# Patient Record
Sex: FEMALE | Race: BLACK OR AFRICAN AMERICAN | NOT HISPANIC OR LATINO
[De-identification: names, ages, dates, MRNs, and addresses within clinical notes are randomized per-mention and may not be internally consistent; named-entity substitution may affect disease eponyms.]

---

## 2022-10-13 ENCOUNTER — NON-APPOINTMENT (OUTPATIENT)
Age: 27
End: 2022-10-13

## 2022-10-13 ENCOUNTER — APPOINTMENT (OUTPATIENT)
Dept: ORTHOPEDIC SURGERY | Facility: CLINIC | Age: 27
End: 2022-10-13

## 2022-10-13 VITALS — BODY MASS INDEX: 28.25 KG/M2 | HEIGHT: 67 IN | WEIGHT: 180 LBS

## 2022-10-13 PROBLEM — Z00.00 ENCOUNTER FOR PREVENTIVE HEALTH EXAMINATION: Status: ACTIVE | Noted: 2022-10-13

## 2022-10-13 PROCEDURE — 72110 X-RAY EXAM L-2 SPINE 4/>VWS: CPT

## 2022-10-13 PROCEDURE — 72070 X-RAY EXAM THORAC SPINE 2VWS: CPT

## 2022-10-13 PROCEDURE — 99072 ADDL SUPL MATRL&STAF TM PHE: CPT

## 2022-10-13 PROCEDURE — 99204 OFFICE O/P NEW MOD 45 MIN: CPT

## 2022-10-13 NOTE — REASON FOR VISIT
[Initial Visit] : an initial visit for [Workers' Comp: Date of Injury: _______] : This visit is related to worker's compensation. Date of Injury: [unfilled]

## 2022-10-27 ENCOUNTER — APPOINTMENT (OUTPATIENT)
Dept: ORTHOPEDIC SURGERY | Facility: CLINIC | Age: 27
End: 2022-10-27

## 2022-10-27 PROCEDURE — 99214 OFFICE O/P EST MOD 30 MIN: CPT

## 2022-10-27 PROCEDURE — 99072 ADDL SUPL MATRL&STAF TM PHE: CPT

## 2022-10-27 RX ORDER — DOXYCYCLINE HYCLATE 100 MG/1
100 TABLET ORAL
Qty: 60 | Refills: 0 | Status: ACTIVE | COMMUNITY
Start: 2022-10-18

## 2022-10-27 RX ORDER — DOXYCYCLINE HYCLATE 100 MG/1
100 CAPSULE ORAL
Qty: 14 | Refills: 0 | Status: ACTIVE | COMMUNITY
Start: 2022-09-24

## 2022-10-27 RX ORDER — NAPROXEN 500 MG/1
500 TABLET ORAL
Qty: 60 | Refills: 1 | Status: ACTIVE | COMMUNITY
Start: 2022-10-27 | End: 1900-01-01

## 2022-10-27 RX ORDER — CYCLOBENZAPRINE HYDROCHLORIDE 5 MG/1
5 TABLET, FILM COATED ORAL 3 TIMES DAILY
Qty: 21 | Refills: 1 | Status: ACTIVE | COMMUNITY
Start: 2022-10-27 | End: 1900-01-01

## 2022-10-27 RX ORDER — NAPROXEN 250 MG/1
250 TABLET ORAL
Qty: 30 | Refills: 0 | Status: ACTIVE | COMMUNITY
Start: 2022-10-11

## 2022-10-27 RX ORDER — FLUOCINONIDE 0.5 MG/ML
0.05 SOLUTION TOPICAL
Qty: 60 | Refills: 0 | Status: ACTIVE | COMMUNITY
Start: 2022-10-18

## 2022-10-27 RX ORDER — KETOCONAZOLE 20.5 MG/ML
2 SHAMPOO, SUSPENSION TOPICAL
Qty: 120 | Refills: 0 | Status: ACTIVE | COMMUNITY
Start: 2022-09-24

## 2022-10-27 RX ORDER — LIDOCAINE 4 G/100G
4 PATCH TOPICAL
Qty: 30 | Refills: 1 | Status: ACTIVE | COMMUNITY
Start: 2022-10-27 | End: 1900-01-01

## 2022-10-27 RX ORDER — CYCLOBENZAPRINE HYDROCHLORIDE 5 MG/1
5 TABLET, FILM COATED ORAL
Qty: 15 | Refills: 0 | Status: ACTIVE | COMMUNITY
Start: 2022-10-11

## 2022-10-27 RX ORDER — TRETINOIN 0.25 MG/G
0.03 CREAM TOPICAL
Qty: 20 | Refills: 0 | Status: ACTIVE | COMMUNITY
Start: 2022-10-18

## 2022-10-27 NOTE — PHYSICAL EXAM
[de-identified] : General: No acute distress, conversant, well-nourished.\par Head: Normocephalic, atraumatic\par Neck: trachea midline, FROM\par Heart: normotensive and normal rate and rhythm\par Lungs: No labored breathing\par Skin: No abrasions, no rashes, no edema\par Psych: Alert and oriented to person, place and time\par Extremities: no peripheral edema or digital cyanosis\par Gait: Normal gait. Can perform tandem gait.  \par Vascular: warm and well perfused distally, palpable distal pulses\par \par MSK:\par Spine:\par No tenderness to palpation.  No step-off, no deformity.\par \par NEURO EXAM:\par Sensation \par Left L2  -  2/2            \par Left L3  -  2/2\par Left L4  -  2/2\par Left L5  -  2/2\par Left S1  -  2/2\par \par Right L2  -  2/2            \par Right L3  -  2/2\par Right L4  -  2/2\par Right L5  -  2/2\par Right S1  -  2/2\par \par Motor: \par Left L2 (hip flexion)                            5/5                \par Left L3 (knee extension)                   5/5                \par Left L4 (ankle dorsiflexion)                 5/5                \par Left L5 (long toe extensor)                5/5                \par Left S1 (ankle plantar flexion)           5/5\par \par Right L2 (hip flexion)                            5/5                \par Right L3 (knee extension)                   5/5                \par Right L4 (ankle dorsiflexion)                 5/5                \par Right L5 (long toe extensor)                5/5                \par Right S1 (ankle plantar flexion)           5/5\par \par Reflexes: Normal and symmetric\par Negative clonus.  Down-going Babinski.   [de-identified] : I ordered radiographs to evaluate the patient's symptoms.\par \par Thoracic 2 view radiographs obtained in the office today shows no fracture or dislocation.  \par \par Lumbar 4 view radiographs taken in the office today show no dislocation or fracture.  No instability on dynamic series.

## 2022-10-27 NOTE — ASSESSMENT
[FreeTextEntry1] : 27 year old female presents with back pain after an injury at work on 9/26/2022.  She was draining water from a cart and the handle on the cart broke causing her to fall. She denied LOC.  She tried continuing with work but the back pain progressively worsened.  She stopped working last week due to the pain. She denies radicular pain. She is neurologically intact.  She can return to work for light duty.  The patient was given a referral for physical therapy.  She can take naproxen and cyclobenzaprine as needed.  She will followup in 2 weeks. We discussed red flag symptoms that would require emergent evaluation. She knows to call with any questions or concerns or if her symptoms acutely worsen.

## 2022-10-27 NOTE — HISTORY OF PRESENT ILLNESS
[de-identified] : 27 year old female followup with back pain after an injury at work on 9/26/2022.  She was draining water from a cart and the handle on the cart broke causing her to fall. She denied LOC.  She tried continuing with work but the back pain progressively worsened.  She stopped working last week due to the pain. She denies radicular pain, recent illness, fevers, numbness, weakness, balance problems, saddle anesthesia, urinary retention or fecal incontinence. Since her last visit she has not yet started PT but is scheduled to do so next week.  She has not been offered a light duty position at work at this time. She takes naproxen and cyclobenzaprine with some relief.

## 2022-10-27 NOTE — ASSESSMENT
[FreeTextEntry1] : 27 year old female followup with back pain after an injury at work on 9/26/2022.  She was draining water from a cart and the handle on the cart broke causing her to fall. She denied LOC.  She tried continuing with work but the back pain progressively worsened.  She stopped working last week due to the pain. She denies radicular pain.  She is neurologically intact.  Her pain remains unchanged.  She has not yet started PT but is scheduled to do so next week.  She will start PT.  She has not been offered a light duty position at work at this time. She can continue naproxen and cyclobenzaprine.  She will followup in 2-3 weeks. We discussed red flag symptoms that would require emergent evaluation. She knows to call with any questions or concerns or if her symptoms acutely worsen.

## 2022-10-27 NOTE — HISTORY OF PRESENT ILLNESS
[de-identified] : 27 year old female presents with back pain after an injury at work on 9/26/2022.  She was draining water from a cart and the handle on the cart broke causing her to fall. She denied LOC.  She tried continuing with work but the back pain progressively worsened.  She stopped working last week due to the pain. She denies radicular pain, recent illness, fevers, numbness, weakness, balance problems, saddle anesthesia, urinary retention or fecal incontinence. She went to [facility] on October 11 and was given naproxen and cyclobenzaprine.

## 2022-10-27 NOTE — REASON FOR VISIT
[Follow-Up Visit] : a follow-up visit for [Workers' Comp: Date of Injury: _______] : This visit is related to worker's compensation. Date of Injury: [unfilled] [Back Pain] : back pain

## 2022-10-27 NOTE — PHYSICAL EXAM
[de-identified] : General: No acute distress, conversant, well-nourished.\par Head: Normocephalic, atraumatic\par Neck: trachea midline, FROM\par Heart: normotensive and normal rate and rhythm\par Lungs: No labored breathing\par Skin: No abrasions, no rashes, no edema\par Psych: Alert and oriented to person, place and time\par Extremities: no peripheral edema or digital cyanosis\par Gait: Normal gait. Can perform tandem gait.  \par Vascular: warm and well perfused distally, palpable distal pulses\par \par MSK:\par Spine:\par No tenderness to palpation.  No step-off, no deformity.\par \par NEURO EXAM:\par Sensation \par Left L2  -  2/2            \par Left L3  -  2/2\par Left L4  -  2/2\par Left L5  -  2/2\par Left S1  -  2/2\par \par Right L2  -  2/2            \par Right L3  -  2/2\par Right L4  -  2/2\par Right L5  -  2/2\par Right S1  -  2/2\par \par Motor: \par Left L2 (hip flexion)                            5/5                \par Left L3 (knee extension)                   5/5                \par Left L4 (ankle dorsiflexion)                 5/5                \par Left L5 (long toe extensor)                5/5                \par Left S1 (ankle plantar flexion)           5/5\par \par Right L2 (hip flexion)                            5/5                \par Right L3 (knee extension)                   5/5                \par Right L4 (ankle dorsiflexion)                 5/5                \par Right L5 (long toe extensor)                5/5                \par Right S1 (ankle plantar flexion)           5/5\par \par Reflexes: Normal and symmetric\par Negative clonus.  Down-going Babinski.   [de-identified] : Thoracic 2 view radiographs (10/13/22) no fracture or dislocation.  \par \par Lumbar 4 view radiographs (10/13/22) no dislocation or fracture.  No instability on dynamic series.

## 2022-10-31 ENCOUNTER — APPOINTMENT (OUTPATIENT)
Dept: OBGYN | Facility: CLINIC | Age: 27
End: 2022-10-31

## 2022-11-16 ENCOUNTER — APPOINTMENT (OUTPATIENT)
Dept: ORTHOPEDIC SURGERY | Facility: CLINIC | Age: 27
End: 2022-11-16

## 2022-12-06 ENCOUNTER — APPOINTMENT (OUTPATIENT)
Dept: ORTHOPEDIC SURGERY | Facility: CLINIC | Age: 27
End: 2022-12-06

## 2022-12-08 ENCOUNTER — APPOINTMENT (OUTPATIENT)
Dept: ORTHOPEDIC SURGERY | Facility: CLINIC | Age: 27
End: 2022-12-08

## 2022-12-14 ENCOUNTER — APPOINTMENT (OUTPATIENT)
Dept: ORTHOPEDIC SURGERY | Facility: CLINIC | Age: 27
End: 2022-12-14
Payer: OTHER MISCELLANEOUS

## 2022-12-14 PROCEDURE — 99214 OFFICE O/P EST MOD 30 MIN: CPT

## 2022-12-14 PROCEDURE — 99072 ADDL SUPL MATRL&STAF TM PHE: CPT

## 2023-01-16 NOTE — PHYSICAL EXAM
[de-identified] : General: No acute distress, conversant, well-nourished.\par Head: Normocephalic, atraumatic\par Neck: trachea midline, FROM\par Heart: normotensive and normal rate and rhythm\par Lungs: No labored breathing\par Skin: No abrasions, no rashes, no edema\par Psych: Alert and oriented to person, place and time\par Extremities: no peripheral edema or digital cyanosis\par Gait: Normal gait. Can perform tandem gait.  \par Vascular: warm and well perfused distally, palpable distal pulses\par \par MSK:\par Spine:\par No tenderness to palpation.  No step-off, no deformity.\par \par NEURO EXAM:\par Sensation \par Left L2  -  2/2            \par Left L3  -  2/2\par Left L4  -  2/2\par Left L5  -  2/2\par Left S1  -  2/2\par \par Right L2  -  2/2            \par Right L3  -  2/2\par Right L4  -  2/2\par Right L5  -  2/2\par Right S1  -  2/2\par \par Motor: \par Left L2 (hip flexion)                            5/5                \par Left L3 (knee extension)                   5/5                \par Left L4 (ankle dorsiflexion)                 5/5                \par Left L5 (long toe extensor)                5/5                \par Left S1 (ankle plantar flexion)           5/5\par \par Right L2 (hip flexion)                            5/5                \par Right L3 (knee extension)                   5/5                \par Right L4 (ankle dorsiflexion)                 5/5                \par Right L5 (long toe extensor)                5/5                \par Right S1 (ankle plantar flexion)           5/5\par \par Reflexes: Normal and symmetric\par Negative clonus.  Down-going Babinski.   [de-identified] : Thoracic 2 view radiographs (10/13/22) no fracture or dislocation.  \par \par Lumbar 4 view radiographs (10/13/22) no dislocation or fracture.  No instability on dynamic series.

## 2023-01-16 NOTE — ASSESSMENT
[FreeTextEntry1] : 27 year old female followup with back pain after an injury at work on 9/26/2022.  She was draining water from a cart and the handle on the cart broke causing her to fall. She denied LOC.  She tried continuing with work but the back pain progressively worsened.  She denies radicular pain.  She is neurologically intact.  Her pain has improved since her last visit. She will return to work without restriction on December 15, 2022.  She can continue naproxen as needed. She can continue PT. She will followup in 4 weeks. We discussed red flag symptoms that would require emergent evaluation. She knows to call with any questions or concerns or if her symptoms acutely worsen.

## 2023-02-01 ENCOUNTER — EMERGENCY (EMERGENCY)
Facility: HOSPITAL | Age: 28
LOS: 1 days | Discharge: HOME | End: 2023-02-01
Admitting: EMERGENCY MEDICINE
Payer: COMMERCIAL

## 2023-02-01 VITALS
HEIGHT: 66 IN | SYSTOLIC BLOOD PRESSURE: 123 MMHG | RESPIRATION RATE: 18 BRPM | TEMPERATURE: 98 F | DIASTOLIC BLOOD PRESSURE: 88 MMHG | WEIGHT: 198.2 LBS | OXYGEN SATURATION: 99 % | HEART RATE: 75 BPM

## 2023-02-01 PROCEDURE — 99283 EMERGENCY DEPT VISIT LOW MDM: CPT

## 2023-02-01 PROCEDURE — 99284 EMERGENCY DEPT VISIT MOD MDM: CPT

## 2023-02-01 RX ORDER — POLYMYXIN B SULF/TRIMETHOPRIM 10000-1/ML
1 DROPS OPHTHALMIC (EYE)
Qty: 1 | Refills: 0
Start: 2023-02-01 | End: 2023-02-07

## 2023-02-01 NOTE — ED PROVIDER NOTE - PHYSICAL EXAMINATION
CONSTITUTIONAL: Well-appearing;  in no apparent distress.   HEAD: Normocephalic; atraumatic.   EYES: VS 20/20 b/l.  PERRL; EOM intact; conjunctiva injected, no discharge. no fluorescin uptake   ENT: normal nose; no rhinorrhea; normal pharynx with no erythema or lesions.

## 2023-02-01 NOTE — ED ADULT NURSE NOTE - OBJECTIVE STATEMENT
27y female c/o R eye redness. states, "I woke up with my eye crusted shut." denies blurry vision, HA, f/c. No acute distress noted at this time.

## 2023-02-01 NOTE — ED PROVIDER NOTE - CLINICAL SUMMARY MEDICAL DECISION MAKING FREE TEXT BOX
28 yo f with no pmh c/o R eye redness since this morning. Pt woke up and her eyelashes were crusted shut. Pt reports redness to eye with stinging and itching. Denies fever, chills, ha, visual changes, trauma. No contact lens use.  VS 20/20 b/l.  PERRL; EOM intact; conjunctiva injected, no discharge. no fluorescin uptake. Will treat for conjunctivitis.

## 2023-02-01 NOTE — ED ADULT TRIAGE NOTE - CHIEF COMPLAINT QUOTE
Pt presents to the ED c/o right eye pain and redness that began this morning upon awakening. Denies any other sx.

## 2023-02-01 NOTE — ED PROVIDER NOTE - OBJECTIVE STATEMENT
26 yo f with no pmh c/o R eye redness since this morning. Pt woke up and her eyelashes were crusted shut. Pt reports redness to eye with stinging and itching. Denies fever, chills, ha, visual changes, trauma. No contact lens use.

## 2023-02-01 NOTE — ED PROVIDER NOTE - PATIENT PORTAL LINK FT
You can access the FollowMyHealth Patient Portal offered by [facility] by registering at the following website: [email or web address]. By joining [employer]’s FollowMyHealth portal, you will also be able to view your health information using other applications (apps) compatible with our system.

## 2023-02-05 DIAGNOSIS — H10.9 UNSPECIFIED CONJUNCTIVITIS: ICD-10-CM

## 2023-02-05 DIAGNOSIS — H57.89 OTHER SPECIFIED DISORDERS OF EYE AND ADNEXA: ICD-10-CM

## 2023-04-29 ENCOUNTER — EMERGENCY (EMERGENCY)
Facility: HOSPITAL | Age: 28
LOS: 1 days | Discharge: HOME | End: 2023-04-29
Attending: EMERGENCY MEDICINE | Admitting: EMERGENCY MEDICINE
Payer: COMMERCIAL

## 2023-04-29 VITALS
RESPIRATION RATE: 18 BRPM | HEART RATE: 83 BPM | SYSTOLIC BLOOD PRESSURE: 110 MMHG | DIASTOLIC BLOOD PRESSURE: 70 MMHG | WEIGHT: 199.08 LBS | HEIGHT: 67 IN | TEMPERATURE: 98 F | OXYGEN SATURATION: 97 %

## 2023-04-29 DIAGNOSIS — H01.003 UNSPECIFIED BLEPHARITIS RIGHT EYE, UNSPECIFIED EYELID: ICD-10-CM

## 2023-04-29 DIAGNOSIS — L03.213 PERIORBITAL CELLULITIS: ICD-10-CM

## 2023-04-29 DIAGNOSIS — H02.849 EDEMA OF UNSPECIFIED EYE, UNSPECIFIED EYELID: ICD-10-CM

## 2023-04-29 PROCEDURE — 99283 EMERGENCY DEPT VISIT LOW MDM: CPT

## 2023-04-29 RX ORDER — ERYTHROMYCIN BASE 5 MG/GRAM
1 OINTMENT (GRAM) OPHTHALMIC (EYE)
Qty: 1 | Refills: 0
Start: 2023-04-29 | End: 2023-05-05

## 2023-04-29 NOTE — ED PROVIDER NOTE - CLINICAL SUMMARY MEDICAL DECISION MAKING FREE TEXT BOX
pt w mild periorbital cellulitis, no signs of orbital cellulitis ,   antibiotics given ,   discussed emergent return instructions

## 2023-04-29 NOTE — ED PROVIDER NOTE - PATIENT PORTAL LINK FT
You can access the FollowMyHealth Patient Portal offered by [facility] by registering at the following website: [email or web address]. By joining [employer]’s FollowMyHealth portal, you will also be able to view your health information using other applications (apps) compatible with our system.

## 2023-04-29 NOTE — ED ADULT TRIAGE NOTE - CHIEF COMPLAINT QUOTE
Pt reports right eye swelling/pain since last night. Pt states " It usually happens when I have allergies. But this is the worst." pt also reports nasal congestion/cough. Pt denies any swelling to lips/tongue or difficulty breathing.

## 2023-04-29 NOTE — ED PROVIDER NOTE - OBJECTIVE STATEMENT
26 yo with swollen eyelid for 2 days , eye discharge and reddness briefly on awakening, no vision change , no fever, no eye pain, no sinus congestion. no trauma, no contacts.

## 2023-04-29 NOTE — ED ADULT NURSE NOTE - OBJECTIVE STATEMENT
patient arrived to ED c/o right eye swelling since yesterday, cough and congestion. denies fever, shortness of breath, nausea or vomiting. endorses right eye blurry vision due to swelling.

## 2023-04-29 NOTE — ED PROVIDER NOTE - EYES, MLM
Clear bilaterally, pupils equal, round and reactive to light. + rt lid swollen and erythematous + blepharitis, slight fullness under eye, no discharge

## 2023-04-29 NOTE — ED PROVIDER NOTE - NSFOLLOWUPINSTRUCTIONS_ED_ALL_ED_FT
Cellulitis    Cellulitis is a skin infection caused by bacteria. This condition occurs most often in the arms and lower legs but can occur anywhere over the body. Symptoms include redness, swelling, warm skin, tenderness, and chills/fever. If you were prescribed an antibiotic medicine, take it as told by your health care provider. Do not stop taking the antibiotic even if you start to feel better.    SEEK IMMEDIATE MEDICAL CARE IF YOU HAVE ANY OF THE FOLLOWING SYMPTOMS: worsening fever, red streaks coming from affected area, vomiting or diarrhea, or dizziness/lightheadedness.   vision problems, eye pain, eye buldging, fevers.

## 2023-05-03 ENCOUNTER — APPOINTMENT (OUTPATIENT)
Dept: OPHTHALMOLOGY | Facility: CLINIC | Age: 28
End: 2023-05-03
Payer: COMMERCIAL

## 2023-05-03 ENCOUNTER — NON-APPOINTMENT (OUTPATIENT)
Age: 28
End: 2023-05-03

## 2023-05-03 PROCEDURE — 92004 COMPRE OPH EXAM NEW PT 1/>: CPT

## 2023-05-10 ENCOUNTER — APPOINTMENT (OUTPATIENT)
Dept: OPHTHALMOLOGY | Facility: CLINIC | Age: 28
End: 2023-05-10

## 2023-05-22 PROBLEM — Z78.9 OTHER SPECIFIED HEALTH STATUS: Chronic | Status: ACTIVE | Noted: 2023-05-11

## 2023-05-31 ENCOUNTER — APPOINTMENT (OUTPATIENT)
Dept: OPHTHALMOLOGY | Facility: CLINIC | Age: 28
End: 2023-05-31
Payer: COMMERCIAL

## 2023-05-31 ENCOUNTER — NON-APPOINTMENT (OUTPATIENT)
Age: 28
End: 2023-05-31

## 2023-05-31 PROCEDURE — 92014 COMPRE OPH EXAM EST PT 1/>: CPT

## 2023-06-05 ENCOUNTER — APPOINTMENT (OUTPATIENT)
Dept: OPHTHALMOLOGY | Facility: CLINIC | Age: 28
End: 2023-06-05

## 2023-07-20 ENCOUNTER — APPOINTMENT (OUTPATIENT)
Dept: OPHTHALMOLOGY | Facility: CLINIC | Age: 28
End: 2023-07-20

## 2023-09-12 ENCOUNTER — EMERGENCY (EMERGENCY)
Facility: HOSPITAL | Age: 28
LOS: 1 days | Discharge: HOME | End: 2023-09-12
Admitting: EMERGENCY MEDICINE
Payer: COMMERCIAL

## 2023-09-12 VITALS
SYSTOLIC BLOOD PRESSURE: 125 MMHG | OXYGEN SATURATION: 99 % | DIASTOLIC BLOOD PRESSURE: 74 MMHG | TEMPERATURE: 98 F | HEART RATE: 86 BPM | RESPIRATION RATE: 18 BRPM | WEIGHT: 209 LBS

## 2023-09-12 PROCEDURE — 99284 EMERGENCY DEPT VISIT MOD MDM: CPT

## 2023-09-12 PROCEDURE — 99282 EMERGENCY DEPT VISIT SF MDM: CPT

## 2023-09-12 RX ORDER — OFLOXACIN 0.3 %
1 DROPS OPHTHALMIC (EYE) ONCE
Refills: 0 | Status: DISCONTINUED | OUTPATIENT
Start: 2023-09-12 | End: 2023-09-15

## 2023-09-12 NOTE — ED PROVIDER NOTE - NSFOLLOWUPINSTRUCTIONS_ED_ALL_ED_FT
You can apply warm compresses to RIGHT eye to alleviate discomfort/swelling, especially in the morning when you notice discharge.      Instill 2 drops of ofloxacin in RIGHT eye only every 4 hours     Please follow up with your eye doctor as scheduled for close follow up as well as to get new glasses    Conjunctivitis    WHAT YOU NEED TO KNOW:    What is conjunctivitis? Conjunctivitis, or pink eye, is inflammation of your conjunctiva. The conjunctiva is a thin tissue that covers the front of your eye and the back of your eyelid. The conjunctiva helps protect your eye and keep it moist.  Conjunctivitis    What causes conjunctivitis? The most common cause of conjunctivitis is infection with bacteria or a virus. Allergies or exposure to a chemical may also cause conjunctivitis. Conjunctivitis is easily spread from person to person.    What are the signs and symptoms of conjunctivitis? You may have symptoms in one or both eyes. You may also have other general symptoms, including a sore throat, runny nose, or fever. You may have any of the following:    Redness in the whites of your eye    Itching in or around your eye    Feeling like there is something in your eye    Watery or thick, sticky discharge    Crusty eyelids when you wake up in the morning    Burning, stinging, or swelling in your eye    Pain when you see bright light  How is conjunctivitis diagnosed? Your healthcare provider will ask about your symptoms and medical history. Tell the provider if you have been around anyone who is sick or has pink eye. Your provider may ask if you have allergies or wear contact lenses. You may need any of the following:    A visual acuity test checks your vision in both eyes. You will be asked to read letters and numbers from a chart.    An eye exam may help your provider find out what is causing your conjunctivitis. Your provider will look at your eyes, eyelids, eyelashes, and the skin around your eyes. You will be asked to look in different directions. Your provider may gently press on your eye or eyelid to see if there is drainage. Your provider may gently swab your conjunctiva with a cotton swab and send it to the lab for tests.    A slit-lamp exam may show a cut, scratch, or other damage to your cornea. This exam may also show a foreign object in your eye. A microscope with a bright light is used to check every part of your eye. A dye called fluorescein may be used.    How is conjunctivitis treated? Your conjunctivitis may go away on its own. Treatment depends on what is causing your conjunctivitis. You may need any of the following:    Allergy medicine helps decrease itchy, red, swollen eyes caused by allergies. It may be given as a pill, eye drops, or nasal spray.    Antibiotics may be needed if your conjunctivitis is caused by bacteria. This medicine may be given as a pill, eye drops, or eye ointment.  How can I manage my symptoms?    Apply a cool compress. Wet a washcloth with cold water and place it on your eye. This will help decrease itching and irritation.    Use artificial tears. This will help lessen your symptoms, including itching or irritation.    Do not wear contact lenses until treatment is complete and your symptoms are gone.    Flush your eye. You may need to flush your eye with saline to help decrease your symptoms. Ask for more information on how to flush your eye.  How do I prevent the spread of conjunctivitis?    Wash your hands with soap and water often. Wash your hands before and after you touch your eyes. Wash your hands after you use the bathroom, change a child's diaper, or sneeze. Wash your hands before you prepare or eat food.  Handwashing      Avoid contact with others. Do not share towels or washcloths. Try to stay away from others as much as possible. Ask when you can return to work or school.    Avoid allergens and irritants. Try to avoid the things that cause your allergies, such as pets, dust, or grass. Stay away from smoke filled areas. Shield your eyes from wind and sun.    Throw away eye makeup. Bacteria can stay in eye makeup. Throw away your current mascara and other eye makeup. Never share mascara or other eye makeup with anyone.  When should I seek immediate care?    You have worsening eye pain.    The swelling in your eye gets worse, even after treatment.    Your vision suddenly becomes worse, or you cannot see at all.  When should I call my doctor?    Your start to notice changes in your vision.    You develop a fever and ear pain.    You have tiny bumps or spots of blood on your eye.    You have questions or concerns about your condition or care.  CARE AGREEMENT:    You have the right to help plan your care. Learn about your health condition and how it may be treated. Discuss treatment options with your healthcare providers to decide what care you want to receive. You always have the right to refuse treatment.

## 2023-09-12 NOTE — ED ADULT NURSE NOTE - OBJECTIVE STATEMENT
29 y/o F presents to the ED c/o bilateral upper and lower eyelid swelling with pain, discharge, pruritis, blurred vision, and double vision x4 months. "I saw [facility] a while ago but I think you guys misdiagnosed me. You gave me an ointment for my eyelid swelling but it's not getting better. It really hurts. It'll go from one eye to the other. It makes my vision blurry and double. Sometimes it iches. When I wake up in the morning, there is a yellow crust but I wipe the yellow goop throughout the day preventing crusting. I can't get another appointment with an eye doctor until October so I came here." On exam, A&Ox4, NAD, ambulatory on RA. VSS. Eating in exam. B/l eyelids upper and lower edematous but able to remain open. Right sclera erythematous. No discharge. PERRLA.

## 2023-09-12 NOTE — ED PROVIDER NOTE - OBJECTIVE STATEMENT
28 F seasonal allergies (takes claritin w/ relief) p/w acute on chronic R eye swelling/itching x4 mons.  pt reports swellin to R upper lateral eyelid for past week w/ itching and crusting lids in the morning.  seen in past for similar sxs; inn ED given emycin ointment which did not help, then f/u with ophtho and initially given omeprazole told its likely viral but no relief.  then returned and given Ofloxacin drops which resolved issues.  now w/ return of same sxs.  no contacts, typically wears glasses.  lost glasses so states somewhat blurred but otherwise no double vision/flashes/floaters, no ophthalmoplegia.  no eye trauma or FB sensation.  denies f/c, HA, dizziness, nv, uri sxs, vision loss, truama

## 2023-09-12 NOTE — ED PROVIDER NOTE - PHYSICAL EXAMINATION
Gen: well appearing, no acute distress  Skin: warm/dry, no rash noted  HEENT: ncat, eomi, perrla, no nystagmus, injected R sclera and + superior lateral chemosis, no erythema or periorbital ttp, no FB, VA L 20/50, R 20/70 uncorrected (lost glasses)  Resp: breathing comfortably, speaking in full sentences, no dyspnea  Neuro: alert/oriented, ambulatory

## 2023-09-12 NOTE — ED PROVIDER NOTE - PATIENT PORTAL LINK FT
You can access the FollowMyHealth Patient Portal offered by [facility] by registering at the following website: [email or web address]. By joining [employer]’s FollowMyHealth portal, you will also be able to view your health information using other applications (apps) compatible with our system.

## 2023-09-12 NOTE — ED PROVIDER NOTE - CLINICAL SUMMARY MEDICAL DECISION MAKING FREE TEXT BOX
28 F seasonal allergies (takes claritin w/ relief) p/w acute on chronic R eye swelling/itching x4 mons.  on exam vss, afebrile, HEENT: ncat, eomi, perrla, no nystagmus, injected R sclera and + superior lateral chemosis, no erythema or periorbital ttp, no FB, VA L 20/50, R 20/70 uncorrected (lost glasses).  suspect allergic conjunctivitis w/ ? bacterial conjunctivitis vs blepharitis; no c/f periorbital cellulitis.  ongoing sxs for some time, only thing that provided relief is ofloxacin drops- will dc w/ drops, educated on compresses/avoid touching eyes/hygiene and pt already has f/u with ophtho outpt.  discussed strict return parameters

## 2023-09-14 ENCOUNTER — APPOINTMENT (OUTPATIENT)
Dept: ORTHOPEDIC SURGERY | Facility: CLINIC | Age: 28
End: 2023-09-14

## 2023-09-15 DIAGNOSIS — H57.11 OCULAR PAIN, RIGHT EYE: ICD-10-CM

## 2023-11-07 ENCOUNTER — EMERGENCY (EMERGENCY)
Facility: HOSPITAL | Age: 28
LOS: 1 days | Discharge: HOME | End: 2023-11-07
Attending: STUDENT IN AN ORGANIZED HEALTH CARE EDUCATION/TRAINING PROGRAM | Admitting: STUDENT IN AN ORGANIZED HEALTH CARE EDUCATION/TRAINING PROGRAM
Payer: COMMERCIAL

## 2023-11-07 VITALS
TEMPERATURE: 98 F | SYSTOLIC BLOOD PRESSURE: 127 MMHG | OXYGEN SATURATION: 99 % | HEIGHT: 67 IN | RESPIRATION RATE: 18 BRPM | HEART RATE: 90 BPM | WEIGHT: 199.96 LBS | DIASTOLIC BLOOD PRESSURE: 83 MMHG

## 2023-11-07 PROCEDURE — 73130 X-RAY EXAM OF HAND: CPT | Mod: 26,LT

## 2023-11-07 PROCEDURE — 73130 X-RAY EXAM OF HAND: CPT

## 2023-11-07 PROCEDURE — 73110 X-RAY EXAM OF WRIST: CPT | Mod: 26,LT

## 2023-11-07 PROCEDURE — 73110 X-RAY EXAM OF WRIST: CPT

## 2023-11-07 PROCEDURE — 99284 EMERGENCY DEPT VISIT MOD MDM: CPT | Mod: 25

## 2023-11-07 PROCEDURE — 99283 EMERGENCY DEPT VISIT LOW MDM: CPT

## 2023-11-07 RX ORDER — ACETAMINOPHEN 500 MG
650 TABLET ORAL ONCE
Refills: 0 | Status: COMPLETED | OUTPATIENT
Start: 2023-11-07 | End: 2023-11-07

## 2023-11-07 RX ADMIN — Medication 650 MILLIGRAM(S): at 13:23

## 2023-11-07 NOTE — ED PROVIDER NOTE - PATIENT PORTAL LINK FT
You can access the FollowMyHealth Patient Portal offered by [facility] by registering at the following website: [email or web address]. By joining [employer]’s FollowMyHealth portal, you will also be able to view your health information using other applications (apps) compatible with our system.

## 2023-11-07 NOTE — ED PROVIDER NOTE - CARE PROVIDER_API CALL
Horacio Piña [facility]  Plastic Surgery  [address] [place] [id number]  Phone: [phone]  Fax: [phone]  Follow Up Time: Urgent

## 2023-11-07 NOTE — ED ADULT NURSE NOTE - NS ED NURSE DC INFO COMPLEXITY
Patient Name: Jade Carcamo             Procedure Date: 9/10/2018 10:19 AM   MRN: [id number]                       Account Number: [id number]   YOB: 1966             Admit Type: Outpatient   Age: 51                                Gender: Female   Note Status: Finalized                Attending MD: Olvin Hart MD   Total Sedation Time:                     _______________________________________________________________________________       Procedure:           ERCP   Indications:         Stent removal, Bile duct stricture   Providers:           Olvin Hart MD   Patient Profile:     Ms Carcamo is a 52yo woman with polycystic liver disease                        who was struggling with nightly fevers for some time and                        cholangitis was suspected. ERCP in July demonstrated                        left intrahepatic dominance and without overt stenosis,                        however given her history stents were empirically placed                        and her course improved. She now returns for repeat                        interrogation with plans for stent free trial.   Referring MD:        Davis Hobbs MD, Greg Guerrero   Requesting Provider: Sonido Cruz MD   Medicines:           Indomethacin 100 mg LA, General Anesthesia   Complications:       No immediate complications.   _______________________________________________________________________________   Procedure:           Pre-Anesthesia Assessment:                        - Prior to the procedure, a History and Physical was                        performed, and patient medications and allergies were                        reviewed. The patient is competent. The risks and                        benefits of the procedure and the sedation options and                        risks were discussed with the patient. All questions                        were answered and informed consent was obtained. Patient                         identification and proposed procedure were verified by                        the nurse in the pre-procedure area. Mental Status                        Examination: alert and oriented. Airway Examination:                        Mallampati Class II (the uvula but not tonsillar pillars                        visualized). Respiratory Examination: clear to                        auscultation. CV Examination: normal. ASA Grade                        Assessment: II - A patient with mild systemic disease.                        After reviewing the risks and benefits, the patient was                        deemed in satisfactory condition to undergo the                        procedure. The anesthesia plan was to use general                        anesthesia. Immediately prior to administration of                        medications, the patient was re-assessed for adequacy to                        receive sedatives. The heart rate, respiratory rate,                        oxygen saturations, blood pressure, adequacy of                        pulmonary ventilation, and response to care were                        monitored throughout the procedure. The physical status                        of the patient was re-assessed after the procedure.                        After obtaining informed consent, the scope was passed                        under direct vision. Throughout the procedure, the                        patient's blood pressure, pulse, and oxygen saturations                        were monitored continuously. The duodenoscope was                        introduced through the mouth, and used to inject                        contrast into and used to inject contrast into the bile                        duct. The ERCP was accomplished without difficulty. The                        patient tolerated the procedure well.                                                                                    "  Findings:        A  film suggested spontaneous outward migration of the short        biliary stent and the pancreatic stent, and inward migration of the long        biliary stents. These findings were confirmed on limited white light        imaging of the foregut which also demonstrated a widely patent biliary        sphincterotomy. The bile duct was deeply cannulated with the short-nosed        traction sphincterotome and later the 15 mm occlusion balloon in tandem        with multiple 0.025\" Visiglide wires. Contrast was injected and I        personally interpreted the bile duct images. Ductal flow of contrast was        adequate, image quality was excellent and contrast extended throughout        the intrahepatic ducts. As previous, there were shortened right        intrahepatics with impressive elongation of the left primary meandering        to the left lower quadrant. However there was no evidence of dilation or        overt stenosis. Again the common duct was moderately dilated with smooth        taper within the distal third. The cystic was patent and the gallbladder        partially opacified. To recover the stent, a 15mm balloon was used to        sweep the stent across the orifice and then the stent was removed by        snare. The biliary system was then irrigated with 30cc of sterile        saline. The ventral pancreatic duct and orifice were selectively not        interrogated.                                                                                     Impression:          - Patent biliary sphincterotomy with upward migration of                        the longer biliary stent, and spotaneous ejection of the                        pancreatic and shorter biliary stent                        - As previous, shortened right intrahepatics with                        impressive elongation of the left primary meandering to                        the left lower quadrant without overt stenosis     "                    - Otherwise unremarkable biliary system                        - Successful retrieval of remaining biliary stent                        followed by saline irrigation of the system                        - Stent free trial pursued   Recommendation:      - Standard outpatient general anesthesia recovery with                        probable discharge home this afternoon                        - No planned interventional GI procedure at this moment;                        follow up with you established providers as scheduled                        - With recurrent fevers, repeat ERCP will need to be                        considered despite fluoroscopic findings                        - The findings and recommendations were discussed with                        the patient and their family                                                                                       electronically signed by MARCELO Hart   Simple: Patient demonstrates quick and easy understanding

## 2023-11-07 NOTE — ED ADULT NURSE NOTE - NSFALLUNIVINTERV_ED_ALL_ED
Bed/Stretcher in lowest position, wheels locked, appropriate side rails in place/Call bell, personal items and telephone in reach/Instruct patient to call for assistance before getting out of bed/chair/stretcher/Non-slip footwear applied when patient is off stretcher/[place] to call system/Physically safe environment - no spills, clutter or unnecessary equipment/Purposeful proactive rounding/Room/bathroom lighting operational, light cord in reach

## 2023-11-07 NOTE — ED ADULT NURSE NOTE - NSSEPSISSUSPECTED_ED_A_ED
"Anesthesia Transfer of Care Note    Patient: Tae Delgado    Procedure(s) Performed: Procedure(s) (LRB):  INSERTION-LEFT VENTRICULAR ASSIST DEVICE (Left)    Patient location: ICU    Anesthesia Type: general    Transport from OR: Transported from OR intubated on 100% O2 by AMBU with adequate controlled ventilation. Upon arrival to PACU/ICU, patient attached to ventilator and auscultated to confirm bilateral breath sounds and adequate TV. Continuous SpO2 monitoring in transport. Continuous ECG monitoring in transport. Continuos invasive BP monitoring in transport    Post pain: adequate analgesia    Post assessment: no apparent anesthetic complications    Post vital signs: stable    Level of consciousness: unresponsive and sedated    Nausea/Vomiting: no nausea/vomiting    Complications: none    Transfer of care protocol was followed      Last vitals:   Visit Vitals  BP (!) 100/59 (BP Location: Right arm, Patient Position: Lying)   Pulse 108   Temp 37.3 °C (99.2 °F) (Core ([place]))   Resp 14   Ht 5' 10" (1.778 m)   Wt 119.3 kg (263 lb)   SpO2 (!) 93%   BMI 37.74 kg/m²     " No

## 2023-11-07 NOTE — ED PROVIDER NOTE - OBJECTIVE STATEMENT
28 year old female with history of seasonal allergies presenting with L 4th finger pain/numbness s/p work injury 28 year old female with history of seasonal allergies presenting with L 4th finger pain/numbness s/p work injury. 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 Reports having pain to L 3rd and 4th fingers and feels numb in her 4th finger. Having difficulty bending the 4th finger. No wound/laceration/bleeding. 28 year old female with history of seasonal allergies presenting with L 4th finger pain/numbness s/p work injury. 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 Reports having pain to L 3rd and 4th fingers and feels numb in her 4th finger described as pins and needles. Having difficulty bending the 4th finger. No wound/laceration/bleeding.

## 2023-11-07 NOTE — ED PROVIDER NOTE - NSFOLLOWUPINSTRUCTIONS_ED_ALL_ED_FT
You were seen in the Emergency Department for:    You were prescribed to the pharmacy:  Please follow the instructions on the container/label and ask your pharmacist for any questions/concerns.    For pain, you may take Tylenol (acetaminophen) 975 mg every 6 hours, AND/OR Advil (ibuprofen) 600 mg every 8 hours.    Please follow up with hand surgeon Dr. Piña as discussed.     If you do not have a primary physician or specialist of your needs, please call [phone] to find one convenient for you. At this number you will be able to locate a provider who accepts your insurance, as well as locate the right specialist for your needs.    You should return to the Emergency Department if you feel any new/worsening/persistent symptoms including but not limited to: chest pain, difficulty breathing, loss of consciousness, bleeding, uncontrolled pain, numbness/weakness of a body part You were seen in the Emergency Department for: left 4th finger pain after work injury    For pain, you may take Tylenol (acetaminophen) 975 mg every 6 hours, AND/OR Advil (ibuprofen) 600 mg every 8 hours.    Please follow up with hand surgeon Dr. Piña as discussed.     If you do not have a primary physician or specialist of your needs, please call [phone] to find one convenient for you. At this number you will be able to locate a provider who accepts your insurance, as well as locate the right specialist for your needs.    You should return to the Emergency Department if you feel any new/worsening/persistent symptoms including but not limited to: chest pain, difficulty breathing, loss of consciousness, bleeding, uncontrolled pain, numbness/weakness of a body part

## 2023-11-07 NOTE — ED ADULT TRIAGE NOTE - CHIEF COMPLAINT QUOTE
Pt aaox3 c/o L sided 3rd and 4th digit pain after getting fingers pinched between a metal rack and metal wall while working.

## 2023-11-07 NOTE — ED PROVIDER NOTE - PROVIDER TOKENS
OREN superimposed on Stage III CKD  concern for AIN 2/2 antibiotics   Creatinine down-trending  hold off on IVF as CXR with pulmonary edema  avoid nephrotoxic agents, dose as per GFR  monitor creatinine, electrolytes  holding Lisinopril, Nifedipine incr. 90 mg daily   f/u C3/ C4; ASO negative  Urine eosinophils negative  renal ultrasound negative for hydronephrosis, hyperechoic kidneys significant for medical renal disease  UO improving, Cr mildly improving  Strict I&O, if Cr improving will perform TOV  Nephrology Dr. Rodriguez consulted PROVIDER:[TOKEN:[[id number]],FOLLOWUP:[Urgent]] patient not voiding for last 24 hours  possible cause of ARF  RBUS with hypoechoic kidneys and 500 mL bladder  s/p ibrahim catheter   c/w flomax  monitor UO and Cr, if improving may attempt TOV in a few days  Nephrology dr. Rodriguez

## 2023-11-07 NOTE — ED PROVIDER NOTE - PROGRESS NOTE DETAILS
Jovanny Rico MD: XR negative for acute pathology. Patient reassessed--now has full ROM/strength/sensation to LT. Has minimal pain. No need for splint. Will dc with f/u with Dr. Piña

## 2023-11-07 NOTE — ED PROVIDER NOTE - CLINICAL SUMMARY MEDICAL DECISION MAKING FREE TEXT BOX
28 year old female with history of seasonal allergies presenting with L 4th finger pain/numbness s/p work injury. 28 year old female with history of seasonal allergies presenting with L 4th finger pain/numbness s/p work injury. Overall is comfortable here with good vitals. Patient having some difficulty in flexing L 4th digit here due to pain as well as decreased sensation to LT due to "pins and needles" sensation but has full sensation and motor in the 5th digit -- not consistent with full ulnar nerve injury. Likely transient peripheral digital nerve injury due to impact. No appreciable swelling/deformity/wound--very low suspicion for acute fracture or dislocation. If XR negative, will apply ulnar gutter splint and give f/u with hand. Patient agreeable to plan.

## 2023-11-07 NOTE — ED PROVIDER NOTE - PHYSICAL EXAMINATION
Gen - NAD; well-appearing; A+Ox3  HEENT - NCAT, EOMI, moist mucous membranes, clear oropharynx  Neck - supple  Resp - CTAB, no increased WOB  CV -  RRR, no m/r/g  Abd - soft, NT, ND; no guarding or rebound  MSK - no gross deformity, soft compartments, cap refill <3 sec throughout, no joint effusion; +weakness in L 4th digit flexion/extension at PIP/DIP with decreased sensation to LT over both dorsal/palmar aspects  Extrem - no LE edema/erythema/tenderness  Neuro - no focal motor or sensation deficits beyond what is noted above in MSK  Skin - warm, well perfused; no wound, laceration, swelling, erythema, ecchymosis Gen - NAD; well-appearing; A+Ox3  HEENT - NCAT, EOMI, moist mucous membranes, clear oropharynx  Neck - supple  Resp - CTAB, no increased WOB  CV -  RRR, no m/r/g  Abd - soft, NT, ND; no guarding or rebound  MSK - no gross deformity, soft compartments, cap refill <3 sec throughout, no joint effusion; +weakness in L 4th digit flexion/extension at PIP/DIP with decreased sensation to LT over both dorsal/palmar aspects but full sensation and motor strength in the other digits  Extrem - no LE edema/erythema/tenderness  Neuro - no focal motor or sensation deficits beyond what is noted above in MSK  Skin - warm, well perfused; no wound, laceration, swelling, erythema, ecchymosis

## 2023-11-07 NOTE — ED ADULT NURSE NOTE - OBJECTIVE STATEMENT
Left sided 3rd and 4th finger pain s/p slamming them in a door. No major trauma noted. Distal PMS intact. Alert, oriented, and ambulatory. No other injuries noted.

## 2023-11-09 DIAGNOSIS — M79.645 PAIN IN LEFT FINGER(S): ICD-10-CM
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
## 2023-11-09 DIAGNOSIS — R20.0 ANESTHESIA OF SKIN: ICD-10-CM

## 2024-03-20 ENCOUNTER — APPOINTMENT (OUTPATIENT)
Dept: ORTHOPEDIC SURGERY | Facility: CLINIC | Age: 29
End: 2024-03-20
Payer: COMMERCIAL

## 2024-03-20 PROCEDURE — 72110 X-RAY EXAM L-2 SPINE 4/>VWS: CPT

## 2024-03-20 PROCEDURE — 99214 OFFICE O/P EST MOD 30 MIN: CPT

## 2024-03-20 RX ORDER — CYCLOBENZAPRINE HYDROCHLORIDE 5 MG/1
5 TABLET, FILM COATED ORAL 3 TIMES DAILY
Qty: 30 | Refills: 0 | Status: ACTIVE | COMMUNITY
Start: 2024-03-20 | End: 1900-01-01

## 2024-03-20 RX ORDER — NAPROXEN 500 MG/1
500 TABLET ORAL
Qty: 60 | Refills: 1 | Status: ACTIVE | COMMUNITY
Start: 2024-03-20 | End: 1900-01-01

## 2024-03-20 NOTE — ASSESSMENT
[FreeTextEntry1] : 27 y/o female presenting with chronic lower back pain. Pt previously seen for similar issue 2 years ago, which was part of a worker's comp case. 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 She has had to reduce her hours due to pain.  denies radicular pain, recent illness, fevers, numbness, weakness, balance problems, saddle anesthesia, urinary retention or fecal incontinence.  Patient will be sent for a MRI. The patient was given a referral for physical therapy. Start cyclobenzaprine. Start naproxen. Letter given for work restriction (no lifting >20 lbs). Follow up in 3 weeks. We discussed red flag symptoms that would require emergent evaluation. She knows to call with any questions or concerns or if her symptoms acutely worsen.

## 2024-03-20 NOTE — HISTORY OF PRESENT ILLNESS
[de-identified] : 27 y/o female presenting with chronic lower back pain. Pt previously seen for similar issue 2 years ago, which was part of a worker's comp case. 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 She has had to reduce her hours due to pain.  denies radicular pain, recent illness, fevers, numbness, weakness, balance problems, saddle anesthesia, urinary retention or fecal incontinence.

## 2024-03-20 NOTE — REASON FOR VISIT
[Follow-Up Visit] : a follow-up visit for [Back Pain] : back pain [FreeTextEntry2] : pain level 8/10, experiencing stiffness, sharp pain

## 2024-03-20 NOTE — PHYSICAL EXAM
[de-identified] : Lumbar 4 view radiographs taken in the office today show no dislocation or fracture.  Lumbar spondylosis.  No instability on dynamic series. [de-identified] : General: No acute distress, conversant, well-nourished. Head: Normocephalic, atraumatic Neck: trachea midline, FROM Heart: normotensive and normal rate and rhythm Lungs: No labored breathing Skin: No abrasions, no rashes, no edema Psych: Alert and oriented to person, place and time Extremities: no peripheral edema or digital cyanosis Gait: Normal gait. Can perform tandem gait.  Vascular: warm and well perfused distally, palpable distal pulses  MSK: Lumbar spine: + tenderness to palpation.  No step-off, no deformity.   NEURO EXAM: Sensation Left L2  -  2/2            Left L3  -  2/2 Left L4  -  2/2 Left L5  -  2/2 Left S1  -  2/2   Right L2  -  2/2            Right L3  -  2/2 Right L4  -  2/2 Right L5  -  2/2 Right S1  -  2/2   Motor: Left L2 (hip flexion)                            5/5                Left L3 (knee extension)                   5/5                Left L4 (ankle dorsiflexion)                 5/5                Left L5 (long toe extensor)                5/5                Left S1 (ankle plantar flexion)           5/5   Right L2 (hip flexion)                            5/5                Right L3 (knee extension)                   5/5                Right L4 (ankle dorsiflexion)                 5/5                Right L5 (long toe extensor)                5/5                Right S1 (ankle plantar flexion)           5/5   Reflexes: Normal and symmetric Negative clonus.  Down-going Babinski.

## 2024-04-04 ENCOUNTER — OUTPATIENT (OUTPATIENT)
Dept: OUTPATIENT SERVICES | Facility: HOSPITAL | Age: 29
LOS: 1 days | End: 2024-04-04
Payer: OTHER MISCELLANEOUS

## 2024-04-04 ENCOUNTER — APPOINTMENT (OUTPATIENT)
Dept: MRI IMAGING | Facility: HOSPITAL | Age: 29
End: 2024-04-04

## 2024-04-04 PROCEDURE — 72148 MRI LUMBAR SPINE W/O DYE: CPT | Mod: 26

## 2024-04-04 PROCEDURE — 72148 MRI LUMBAR SPINE W/O DYE: CPT

## 2024-04-10 ENCOUNTER — APPOINTMENT (OUTPATIENT)
Dept: ORTHOPEDIC SURGERY | Facility: CLINIC | Age: 29
End: 2024-04-10
Payer: OTHER MISCELLANEOUS

## 2024-04-10 PROCEDURE — 99214 OFFICE O/P EST MOD 30 MIN: CPT

## 2024-04-10 NOTE — ASSESSMENT
[FreeTextEntry1] : 27 y/o female presenting for follow-up with back pain after an injury at work on 9/26/2022. Pain radiates into left leg. She was draining water from a cart and the handle on the cart broke causing her to fall. She denied LOC. She tried continuing with work, but the back pain progressively worsened. Worker's compensation case reopened 4/24. She has been taking naproxen but is having dizziness. She is starting physical therapy tomorrow. She denies radicular pain, recent illness, fevers, numbness, weakness, balance problems, saddle anesthesia, urinary retention or fecal incontinence. I independently reviewed MRI of lumbar spine which showed degenerative changes at L4-L5, L5-S1. Discontinue naproxen. Start tylenol. Start PT. The patient was given a referral for physical therapy. The patient was given a referral for consideration for spinal injections. Follow up in 4 weeks. We discussed red flag symptoms that would require emergent evaluation. She knows to call with any questions or concerns or if her symptoms acutely worsen.

## 2024-04-10 NOTE — PHYSICAL EXAM
[de-identified] : General: No acute distress, conversant, well-nourished. Head: Normocephalic, atraumatic Neck: trachea midline, FROM Heart: normotensive and normal rate and rhythm Lungs: No labored breathing Skin: No abrasions, no rashes, no edema Psych: Alert and oriented to person, place and time Extremities: no peripheral edema or digital cyanosis Gait: Normal gait. Can perform tandem gait. Vascular: warm and well perfused distally, palpable distal pulses  MSK: Lumbar spine: + tenderness to palpation. No step-off, no deformity.  NEURO EXAM: Sensation Left L2 - 2/2 Left L3 - 2/2 Left L4 - 2/2 Left L5 - 2/2 Left S1 - 2/2  Right L2 - 2/2 Right L3 - 2/2 Right L4 - 2/2 Right L5 - 2/2 Right S1 - 2/2  Motor: Left L2 (hip flexion) 5/5 Left L3 (knee extension) 5/5 Left L4 (ankle dorsiflexion) 5/5 Left L5 (long toe extensor) 5/5 Left S1 (ankle plantar flexion) 5/5  Right L2 (hip flexion) 5/5 Right L3 (knee extension) 5/5 Right L4 (ankle dorsiflexion) 5/5 Right L5 (long toe extensor) 5/5 Right S1 (ankle plantar flexion) 5/5  Reflexes: Normal and symmetric Negative clonus. Down-going Babinski. [de-identified] : I independently reviewed MRI of lumbar spine which showed degenerative changes at L4-L5, L5-S1  IMPRESSION: L4/L5 minimal disc bulge. L4/L5 no evidence of spinal canal stenosis L5-S1 moderate diffuse disc bulge compressing the bilateral L5 foraminal exiting nerve roots with a central disc protrusion/extrusion minimally migrating inferiorly. L5/S1 no evidence of spinal canal stenosis.

## 2024-04-10 NOTE — PHYSICAL EXAM
[de-identified] : General: No acute distress, conversant, well-nourished. Head: Normocephalic, atraumatic Neck: trachea midline, FROM Heart: normotensive and normal rate and rhythm Lungs: No labored breathing Skin: No abrasions, no rashes, no edema Psych: Alert and oriented to person, place and time Extremities: no peripheral edema or digital cyanosis Gait: Normal gait. Can perform tandem gait. Vascular: warm and well perfused distally, palpable distal pulses  MSK: Lumbar spine: + tenderness to palpation. No step-off, no deformity.  NEURO EXAM: Sensation Left L2 - 2/2 Left L3 - 2/2 Left L4 - 2/2 Left L5 - 2/2 Left S1 - 2/2  Right L2 - 2/2 Right L3 - 2/2 Right L4 - 2/2 Right L5 - 2/2 Right S1 - 2/2  Motor: Left L2 (hip flexion) 5/5 Left L3 (knee extension) 5/5 Left L4 (ankle dorsiflexion) 5/5 Left L5 (long toe extensor) 5/5 Left S1 (ankle plantar flexion) 5/5  Right L2 (hip flexion) 5/5 Right L3 (knee extension) 5/5 Right L4 (ankle dorsiflexion) 5/5 Right L5 (long toe extensor) 5/5 Right S1 (ankle plantar flexion) 5/5  Reflexes: Normal and symmetric Negative clonus. Down-going Babinski. [de-identified] : I independently reviewed MRI of lumbar spine which showed degenerative changes at L4-L5, L5-S1  IMPRESSION: L4/L5 minimal disc bulge. L4/L5 no evidence of spinal canal stenosis L5-S1 moderate diffuse disc bulge compressing the bilateral L5 foraminal exiting nerve roots with a central disc protrusion/extrusion minimally migrating inferiorly. L5/S1 no evidence of spinal canal stenosis.

## 2024-04-10 NOTE — HISTORY OF PRESENT ILLNESS
[de-identified] : 29 y/o female presenting for followup with back pain after an injury at work on 9/26/2022. Pain radiates into left leg. She was draining water from a cart and the handle on the cart broke causing her to fall. She denied LOC. She tried continuing with work, but the back pain progressively worsened. Worker's compensation case reopened 4/24. She has been taking naproxen but is having dizziness. She is starting physical therapy tomorrow. She denies radicular pain, recent illness, fevers, numbness, weakness, balance problems, saddle anesthesia, urinary retention or fecal incontinence. She has been unable to work 100% disabled.

## 2024-04-10 NOTE — ASSESSMENT
[FreeTextEntry1] : 29 y/o female presenting for follow-up with back pain after an injury at work on 9/26/2022. Pain radiates into left leg. She was draining water from a cart and the handle on the cart broke causing her to fall. She denied LOC. She tried continuing with work, but the back pain progressively worsened. Worker's compensation case reopened 4/24. She has been taking naproxen but is having dizziness. She is starting physical therapy tomorrow. She denies radicular pain, recent illness, fevers, numbness, weakness, balance problems, saddle anesthesia, urinary retention or fecal incontinence. I independently reviewed MRI of lumbar spine which showed degenerative changes at L4-L5, L5-S1. Discontinue naproxen. Start tylenol. Start PT. The patient was given a referral for physical therapy. The patient was given a referral for consideration for spinal injections. Follow up in 4 weeks. We discussed red flag symptoms that would require emergent evaluation. She knows to call with any questions or concerns or if her symptoms acutely worsen.

## 2024-04-10 NOTE — REASON FOR VISIT
[Follow-Up Visit] : a follow-up visit for [Workers' Comp: Date of Injury: _______] : This visit is related to worker's compensation. Date of Injury: [unfilled] [Back Pain] : back pain [FreeTextEntry2] : mri review [facility]

## 2024-04-10 NOTE — HISTORY OF PRESENT ILLNESS
[de-identified] : 29 y/o female presenting for followup with back pain after an injury at work on 9/26/2022. Pain radiates into left leg. She was draining water from a cart and the handle on the cart broke causing her to fall. She denied LOC. She tried continuing with work, but the back pain progressively worsened. Worker's compensation case reopened 4/24. She has been taking naproxen but is having dizziness. She is starting physical therapy tomorrow. She denies radicular pain, recent illness, fevers, numbness, weakness, balance problems, saddle anesthesia, urinary retention or fecal incontinence. She has been unable to work 100% disabled.

## 2024-04-10 NOTE — REASON FOR VISIT
[Follow-Up Visit] : a follow-up visit for [Workers' Comp: Date of Injury: _______] : This visit is related to worker's compensation. Date of Injury: [unfilled] [Back Pain] : back pain [FreeTextEntry2] : mri review [facility]

## 2024-04-15 ENCOUNTER — NON-APPOINTMENT (OUTPATIENT)
Age: 29
End: 2024-04-15

## 2024-04-30 ENCOUNTER — APPOINTMENT (OUTPATIENT)
Dept: ORTHOPEDIC SURGERY | Facility: CLINIC | Age: 29
End: 2024-04-30
Payer: OTHER MISCELLANEOUS

## 2024-04-30 DIAGNOSIS — M54.50 LOW BACK PAIN, UNSPECIFIED: ICD-10-CM

## 2024-04-30 DIAGNOSIS — M54.9 DORSALGIA, UNSPECIFIED: ICD-10-CM

## 2024-04-30 PROCEDURE — 99214 OFFICE O/P EST MOD 30 MIN: CPT

## 2024-05-19 PROBLEM — M54.50 LOW BACK PAIN: Status: ACTIVE | Noted: 2022-10-13

## 2024-05-19 PROBLEM — M54.9 UPPER BACK PAIN: Status: ACTIVE | Noted: 2022-10-13

## 2024-05-19 NOTE — PHYSICAL EXAM
[de-identified] : General: No acute distress, conversant, well-nourished. Head: Normocephalic, atraumatic Neck: trachea midline, FROM Heart: normotensive and normal rate and rhythm Lungs: No labored breathing Skin: No abrasions, no rashes, no edema Psych: Alert and oriented to person, place and time Extremities: no peripheral edema or digital cyanosis Gait: Normal gait. Can perform tandem gait. Vascular: warm and well perfused distally, palpable distal pulses  MSK: Lumbar spine: no tenderness to palpation. No step-off, no deformity.  NEURO EXAM: Sensation Left L2 - 2/2 Left L3 - 2/2 Left L4 - 2/2 Left L5 - 2/2 Left S1 - 2/2  Right L2 - 2/2 Right L3 - 2/2 Right L4 - 2/2 Right L5 - 2/2 Right S1 - 2/2  Motor: Left L2 (hip flexion) 5/5 Left L3 (knee extension) 5/5 Left L4 (ankle dorsiflexion) 5/5 Left L5 (long toe extensor) 5/5 Left S1 (ankle plantar flexion) 5/5  Right L2 (hip flexion) 5/5 Right L3 (knee extension) 5/5 Right L4 (ankle dorsiflexion) 5/5 Right L5 (long toe extensor) 5/5 Right S1 (ankle plantar flexion) 5/5  Reflexes: Normal and symmetric Negative clonus. Down-going Babinski. [de-identified] : Lumbar 4 view radiographs no dislocation or fracture.  Lumbar spondylosis.  No instability on dynamic series.

## 2024-05-19 NOTE — HISTORY OF PRESENT ILLNESS
[de-identified] : 27 y/o female followup with chronic lower back pain. 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 She has had to reduce her hours due to pain.  She denies radicular pain, recent illness, fevers, numbness, weakness, balance problems, saddle anesthesia, urinary retention or fecal incontinence. Pain is improving.

## 2024-05-28 ENCOUNTER — APPOINTMENT (OUTPATIENT)
Dept: ORTHOPEDIC SURGERY | Facility: CLINIC | Age: 29
End: 2024-05-28

## 2024-06-06 ENCOUNTER — APPOINTMENT (OUTPATIENT)
Dept: ORTHOPEDIC SURGERY | Facility: CLINIC | Age: 29
End: 2024-06-06

## 2024-06-06 PROCEDURE — 99214 OFFICE O/P EST MOD 30 MIN: CPT

## 2024-06-06 RX ORDER — CYCLOBENZAPRINE HYDROCHLORIDE 5 MG/1
5 TABLET, FILM COATED ORAL 3 TIMES DAILY
Qty: 42 | Refills: 1 | Status: ACTIVE | COMMUNITY
Start: 2024-06-06 | End: 1900-01-01

## 2024-06-06 NOTE — REASON FOR VISIT
[Follow-Up Visit] : a follow-up visit for [Workers' Comp: Date of Injury: _______] : This visit is related to worker's compensation. Date of Injury: [unfilled] [Back Pain] : back pain [FreeTextEntry2] : pain level 9/10

## 2024-07-17 ENCOUNTER — APPOINTMENT (OUTPATIENT)
Dept: ORTHOPEDIC SURGERY | Facility: CLINIC | Age: 29
End: 2024-07-17
Payer: OTHER MISCELLANEOUS

## 2024-07-17 PROCEDURE — 99214 OFFICE O/P EST MOD 30 MIN: CPT

## 2024-07-17 RX ORDER — CYCLOBENZAPRINE HYDROCHLORIDE 5 MG/1
5 TABLET, FILM COATED ORAL 3 TIMES DAILY
Qty: 42 | Refills: 1 | Status: ACTIVE | COMMUNITY
Start: 2024-07-17 | End: 1900-01-01

## 2024-07-17 RX ORDER — METHYLPREDNISOLONE 4 MG/1
4 TABLET ORAL
Qty: 1 | Refills: 0 | Status: ACTIVE | COMMUNITY
Start: 2024-07-17 | End: 1900-01-01

## 2024-07-24 ENCOUNTER — APPOINTMENT (OUTPATIENT)
Dept: ORTHOPEDIC SURGERY | Facility: CLINIC | Age: 29
End: 2024-07-24
Payer: OTHER MISCELLANEOUS

## 2024-07-24 DIAGNOSIS — M54.9 DORSALGIA, UNSPECIFIED: ICD-10-CM

## 2024-07-24 DIAGNOSIS — M54.50 LOW BACK PAIN, UNSPECIFIED: ICD-10-CM

## 2024-07-24 PROCEDURE — 99214 OFFICE O/P EST MOD 30 MIN: CPT

## 2024-07-24 NOTE — HISTORY OF PRESENT ILLNESS
[de-identified] : 27 y/o female followup with chronic lower back pain. 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 She denies radicular pain, recent illness, fevers, numbness, weakness, balance problems, saddle anesthesia, urinary retention or fecal incontinence. She is unable to work.  She is 100% disabled.  Her pain has worsened.  She reports her PT has not been approved by insurance.

## 2024-07-24 NOTE — ASSESSMENT
[FreeTextEntry1] : 29 y/o female followup with chronic lower back pain. 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 She denies radicular pain, recent illness, fevers, numbness, weakness, balance problems, saddle anesthesia, urinary retention or fecal incontinence. She is unable to work.  She is 100% disabled.  Her pain has worsened.  She reports her PT has not been approved by insurance.  It is medically necessary she have physical therapy. The patient was given a referral for physical therapy.  She will remain out of work 100% disabled.  Continue naproxen and cyclobenzaprine. F/U in 3 weeks. We discussed red flag symptoms that would require emergent evaluation. She knows to call with any questions or concerns or if her symptoms acutely worsen.

## 2024-07-24 NOTE — PHYSICAL EXAM
[de-identified] : General: No acute distress, conversant, well-nourished. Head: Normocephalic, atraumatic Neck: trachea midline, FROM Heart: normotensive and normal rate and rhythm Lungs: No labored breathing Skin: No abrasions, no rashes, no edema Psych: Alert and oriented to person, place and time Extremities: no peripheral edema or digital cyanosis Gait: Normal gait. Can perform tandem gait. Vascular: warm and well perfused distally, palpable distal pulses  MSK: Lumbar spine: no tenderness to palpation. No step-off, no deformity.  NEURO EXAM: Sensation Left L2 - 2/2 Left L3 - 2/2 Left L4 - 2/2 Left L5 - 2/2 Left S1 - 2/2  Right L2 - 2/2 Right L3 - 2/2 Right L4 - 2/2 Right L5 - 2/2 Right S1 - 2/2  Motor: Left L2 (hip flexion) 5/5 Left L3 (knee extension) 5/5 Left L4 (ankle dorsiflexion) 5/5 Left L5 (long toe extensor) 5/5 Left S1 (ankle plantar flexion) 5/5  Right L2 (hip flexion) 5/5 Right L3 (knee extension) 5/5 Right L4 (ankle dorsiflexion) 5/5 Right L5 (long toe extensor) 5/5 Right S1 (ankle plantar flexion) 5/5  Reflexes: Normal and symmetric Negative clonus. Down-going Babinski. [de-identified] : Lumbar 4 view radiographs no dislocation or fracture.  Lumbar spondylosis.  No instability on dynamic series.

## 2024-08-20 ENCOUNTER — APPOINTMENT (OUTPATIENT)
Dept: ORTHOPEDIC SURGERY | Facility: CLINIC | Age: 29
End: 2024-08-20

## 2024-08-20 DIAGNOSIS — M54.50 LOW BACK PAIN, UNSPECIFIED: ICD-10-CM

## 2024-08-20 DIAGNOSIS — M54.9 DORSALGIA, UNSPECIFIED: ICD-10-CM

## 2024-08-20 PROCEDURE — 99214 OFFICE O/P EST MOD 30 MIN: CPT

## 2024-08-30 NOTE — ASSESSMENT
[FreeTextEntry1] : 28 y/o female followup with chronic lower back pain. She denies radicular pain, recent illness, fevers, numbness, weakness, balance problems, saddle anesthesia, urinary retention or fecal incontinence. She reports her PT has not been approved by insurance.  She has been unable to work due to her pain.   She is 100% disabled.  It is medically necessary she have physical therapy. The patient was given a referral for physical therapy. Continue naproxen and cyclobenzaprine. F/U in 3 weeks. We discussed red flag symptoms that would require emergent evaluation. She knows to call with any questions or concerns or if her symptoms acutely worsen.

## 2024-08-30 NOTE — ASSESSMENT
[FreeTextEntry1] : 30 y/o female followup with chronic lower back pain. She denies radicular pain, recent illness, fevers, numbness, weakness, balance problems, saddle anesthesia, urinary retention or fecal incontinence. She reports her PT has not been approved by insurance.  She has been unable to work due to her pain.   She is 100% disabled.  It is medically necessary she have physical therapy. The patient was given a referral for physical therapy. Continue naproxen and cyclobenzaprine. F/U in 3 weeks. We discussed red flag symptoms that would require emergent evaluation. She knows to call with any questions or concerns or if her symptoms acutely worsen.

## 2024-08-30 NOTE — PHYSICAL EXAM
[de-identified] : General: No acute distress, conversant, well-nourished. Head: Normocephalic, atraumatic Neck: trachea midline, FROM Heart: normotensive and normal rate and rhythm Lungs: No labored breathing Skin: No abrasions, no rashes, no edema Psych: Alert and oriented to person, place and time Extremities: no peripheral edema or digital cyanosis Gait: Normal gait. Can perform tandem gait. Vascular: warm and well perfused distally, palpable distal pulses  MSK: Lumbar spine: no tenderness to palpation. No step-off, no deformity.  NEURO EXAM: Sensation Left L2 - 2/2 Left L3 - 2/2 Left L4 - 2/2 Left L5 - 2/2 Left S1 - 2/2  Right L2 - 2/2 Right L3 - 2/2 Right L4 - 2/2 Right L5 - 2/2 Right S1 - 2/2  Motor: Left L2 (hip flexion) 5/5 Left L3 (knee extension) 5/5 Left L4 (ankle dorsiflexion) 5/5 Left L5 (long toe extensor) 5/5 Left S1 (ankle plantar flexion) 5/5  Right L2 (hip flexion) 5/5 Right L3 (knee extension) 5/5 Right L4 (ankle dorsiflexion) 5/5 Right L5 (long toe extensor) 5/5 Right S1 (ankle plantar flexion) 5/5  Reflexes: Normal and symmetric Negative clonus. Down-going Babinski. [de-identified] : Lumbar 4 view radiographs no dislocation or fracture.  Lumbar spondylosis.  No instability on dynamic series.

## 2024-08-30 NOTE — HISTORY OF PRESENT ILLNESS
[FreeTextEntry1] : 28 y/o female followup with chronic lower back pain. She denies radicular pain, recent illness, fevers, numbness, weakness, balance problems, saddle anesthesia, urinary retention or fecal incontinence. She reports her PT has not been approved by insurance.  She has been unable to work due to her pain.

## 2024-08-30 NOTE — PHYSICAL EXAM
[de-identified] : General: No acute distress, conversant, well-nourished. Head: Normocephalic, atraumatic Neck: trachea midline, FROM Heart: normotensive and normal rate and rhythm Lungs: No labored breathing Skin: No abrasions, no rashes, no edema Psych: Alert and oriented to person, place and time Extremities: no peripheral edema or digital cyanosis Gait: Normal gait. Can perform tandem gait. Vascular: warm and well perfused distally, palpable distal pulses  MSK: Lumbar spine: no tenderness to palpation. No step-off, no deformity.  NEURO EXAM: Sensation Left L2 - 2/2 Left L3 - 2/2 Left L4 - 2/2 Left L5 - 2/2 Left S1 - 2/2  Right L2 - 2/2 Right L3 - 2/2 Right L4 - 2/2 Right L5 - 2/2 Right S1 - 2/2  Motor: Left L2 (hip flexion) 5/5 Left L3 (knee extension) 5/5 Left L4 (ankle dorsiflexion) 5/5 Left L5 (long toe extensor) 5/5 Left S1 (ankle plantar flexion) 5/5  Right L2 (hip flexion) 5/5 Right L3 (knee extension) 5/5 Right L4 (ankle dorsiflexion) 5/5 Right L5 (long toe extensor) 5/5 Right S1 (ankle plantar flexion) 5/5  Reflexes: Normal and symmetric Negative clonus. Down-going Babinski. [de-identified] : Lumbar 4 view radiographs no dislocation or fracture.  Lumbar spondylosis.  No instability on dynamic series.

## 2024-09-13 ENCOUNTER — APPOINTMENT (OUTPATIENT)
Dept: ORTHOPEDIC SURGERY | Facility: CLINIC | Age: 29
End: 2024-09-13

## 2024-09-17 ENCOUNTER — APPOINTMENT (OUTPATIENT)
Dept: ORTHOPEDIC SURGERY | Facility: CLINIC | Age: 29
End: 2024-09-17
Payer: OTHER MISCELLANEOUS

## 2024-09-17 DIAGNOSIS — M54.9 DORSALGIA, UNSPECIFIED: ICD-10-CM

## 2024-09-17 DIAGNOSIS — M54.50 LOW BACK PAIN, UNSPECIFIED: ICD-10-CM

## 2024-09-17 PROCEDURE — 99214 OFFICE O/P EST MOD 30 MIN: CPT

## 2024-09-17 NOTE — HISTORY OF PRESENT ILLNESS
[de-identified] : 27 y/o female followup with chronic lower back pain. 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 She denies radicular pain, recent illness, fevers, numbness, weakness, balance problems, saddle anesthesia, urinary retention or fecal incontinence. Since previous visit, patient continues to have localized lower back pain. She is starting a new job working for [employer].

## 2024-09-17 NOTE — REASON FOR VISIT
[Follow-Up Visit] : a follow-up visit for [Workers' Comp: Date of Injury: _______] : This visit is related to worker's compensation. Date of Injury: [unfilled] [Back Pain] : back pain [FreeTextEntry2] : pain level 7/10

## 2024-09-17 NOTE — ASSESSMENT
[FreeTextEntry1] : 29 y/o female followup with chronic lower back pain. 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 She denies radicular pain, recent illness, fevers, numbness, weakness, balance problems, saddle anesthesia, urinary retention or fecal incontinence. Since previous visit, patient continues to have localized lower back pain. She is starting a new job working for [employer]. The patient was given a referral for physical therapy. Continue cyclobenzaprine PRN. Continue naproxen PRN. F/U in 4-6 weeks. We discussed red flag symptoms that would require emergent evaluation. She knows to call with any questions or concerns or if her symptoms acutely worsen.

## 2024-09-17 NOTE — DISCUSSION/SUMMARY
[de-identified] :  I, Dr. Perez personally performed the evaluation and management services for this established patient who presents today with (a) new problem(s)/exacerbation of (an) existing condition(s). That E/M includes conducting the clinically appropriate interval history &/or exam, assessing all new/exacerbated conditions, and establishing a new plan of care. Today, my DAVID, Shant Head was here to observe my evaluation and management service for this new problem/exacerbated condition and follow the plan of care established by me going forward.

## 2024-09-17 NOTE — PHYSICAL EXAM
[de-identified] :  General: No acute distress, conversant, well-nourished. Head: Normocephalic, atraumatic Neck: trachea midline, FROM Heart: normotensive and normal rate and rhythm Lungs: No labored breathing Skin: No abrasions, no rashes, no edema Psych: Alert and oriented to person, place and time Extremities: no peripheral edema or digital cyanosis Gait: Normal gait. Can perform tandem gait.  Vascular: warm and well perfused distally, palpable distal pulses  MSK: Lumbar spine: + tenderness to palpation. No step-off, no deformity.  NEURO EXAM: Sensation Left L2 - 2/2  Left L3 - 2/2 Left L4 - 2/2 Left L5 - 2/2 Left S1 - 2/2  Right L2 - 2/2  Right L3 - 2/2 Right L4 - 2/2 Right L5 - 2/2 Right S1 - 2/2  Motor: Left L2 (hip flexion)    5/5  Left L3 (knee extension)   5/5  Left L4 (ankle dorsiflexion)   5/5  Left L5 (long toe extensor)  5/5  Left S1 (ankle plantar flexion)  5/5  Right L2 (hip flexion)    5/5  Right L3 (knee extension)   5/5  Right L4 (ankle dorsiflexion)   5/5  Right L5 (long toe extensor)  5/5  Right S1 (ankle plantar flexion)  5/5  Reflexes: Normal and symmetric Negative clonus. Down-going Babinski. [de-identified] : Lumbar 4 view radiographs no dislocation or fracture.  Lumbar spondylosis.  No instability on dynamic series.

## 2024-11-12 NOTE — ED ADULT NURSE NOTE - EENT ASSESSMENT, MLM
Name of Medication(s) Requested:  Requested Prescriptions     Pending Prescriptions Disp Refills    fluticasone-salmeterol (WIXELA INHUB) 100-50 MCG/ACT AEPB diskus inhaler 1 each 4     Sig: Inhale 1 puff into the lungs in the morning and 1 puff in the evening.       Medication is on current medication list Yes    Dosage and directions were verified? Yes    Quantity verified: 30 day supply     Pharmacy Verified?  Yes    Last Appointment:  10/15/2024    Future appts:  Future Appointments   Date Time Provider Department Center   1/7/2025 10:00 AM Moe Quiroz MD [facility]   5/20/2025 11:15 AM Isrrael Fuentes MD [facility]        (If no appt send self scheduling link. .REFILLAPPT)  Scheduling request sent?     [x] Yes  [x] No    Does patient need updated?  [] Yes  [x] No  
- - -

## 2024-12-10 ENCOUNTER — APPOINTMENT (OUTPATIENT)
Dept: ORTHOPEDIC SURGERY | Facility: CLINIC | Age: 29
End: 2024-12-10

## 2025-07-21 ENCOUNTER — HOSPITAL ENCOUNTER (EMERGENCY)
Facility: HOSPITAL | Age: 30
Discharge: HOME | End: 2025-07-22
Attending: EMERGENCY MEDICINE | Admitting: EMERGENCY MEDICINE
Payer: MEDICAID

## 2025-07-21 DIAGNOSIS — S02.2XXA NASAL FRACTURE: ICD-10-CM

## 2025-07-21 DIAGNOSIS — R51.9 FACIAL PAIN: Primary | ICD-10-CM

## 2025-07-21 DIAGNOSIS — S06.2XAA BRAIN CONTUSION (HCC): ICD-10-CM

## 2025-07-21 PROCEDURE — 99284 EMERGENCY DEPT VISIT MOD MDM: CPT

## 2025-07-22 ENCOUNTER — APPOINTMENT (EMERGENCY)
Dept: CT IMAGING | Facility: HOSPITAL | Age: 30
End: 2025-07-22
Payer: MEDICAID

## 2025-07-22 VITALS
SYSTOLIC BLOOD PRESSURE: 125 MMHG | HEIGHT: 66 IN | TEMPERATURE: 98 F | WEIGHT: 210 LBS | OXYGEN SATURATION: 100 % | DIASTOLIC BLOOD PRESSURE: 77 MMHG | HEART RATE: 67 BPM | BODY MASS INDEX: 33.75 KG/M2 | RESPIRATION RATE: 18 BRPM

## 2025-07-22 LAB
ALBUMIN SERPL BCG-MCNC: 3.8 G/DL (ref 3.5–5)
ALP SERPL-CCNC: 57 U/L (ref 34–104)
ALT SERPL W P-5'-P-CCNC: 8 U/L (ref 7–52)
ANION GAP SERPL CALCULATED.3IONS-SCNC: 4 MMOL/L (ref 4–13)
APTT PPP: 31 SECONDS (ref 23–34)
AST SERPL W P-5'-P-CCNC: 12 U/L (ref 13–39)
BASOPHILS # BLD MANUAL: 0.16 THOUSAND/UL (ref 0–0.1)
BASOPHILS NFR MAR MANUAL: 3 % (ref 0–1)
BILIRUB SERPL-MCNC: 0.31 MG/DL (ref 0.2–1)
BUN SERPL-MCNC: 11 MG/DL (ref 5–25)
CALCIUM SERPL-MCNC: 8.7 MG/DL (ref 8.4–10.2)
CHLORIDE SERPL-SCNC: 109 MMOL/L (ref 96–108)
CO2 SERPL-SCNC: 27 MMOL/L (ref 21–32)
CREAT SERPL-MCNC: 0.8 MG/DL (ref 0.6–1.3)
EOSINOPHIL # BLD MANUAL: 0.16 THOUSAND/UL (ref 0–0.4)
EOSINOPHIL NFR BLD MANUAL: 3 % (ref 0–6)
ERYTHROCYTE [DISTWIDTH] IN BLOOD BY AUTOMATED COUNT: 13.3 % (ref 11.6–15.1)
GFR SERPL CREATININE-BSD FRML MDRD: 98 ML/MIN/1.73SQ M
GLUCOSE SERPL-MCNC: 91 MG/DL (ref 65–140)
HCT VFR BLD AUTO: 36.1 % (ref 34.8–46.1)
HGB BLD-MCNC: 11.9 G/DL (ref 11.5–15.4)
INR PPP: 0.96 (ref 0.85–1.19)
LYMPHOCYTES # BLD AUTO: 2.6 THOUSAND/UL (ref 0.6–4.47)
LYMPHOCYTES # BLD AUTO: 48 % (ref 14–44)
MCH RBC QN AUTO: 30.2 PG (ref 26.8–34.3)
MCHC RBC AUTO-ENTMCNC: 33 G/DL (ref 31.4–37.4)
MCV RBC AUTO: 92 FL (ref 82–98)
MONOCYTES # BLD AUTO: 0.11 THOUSAND/UL (ref 0–1.22)
MONOCYTES NFR BLD: 2 % (ref 4–12)
NEUTROPHILS # BLD MANUAL: 2.38 THOUSAND/UL (ref 1.85–7.62)
NEUTS SEG NFR BLD AUTO: 44 % (ref 43–75)
PLATELET # BLD AUTO: 256 THOUSANDS/UL (ref 149–390)
PLATELET BLD QL SMEAR: ADEQUATE
PMV BLD AUTO: 9.2 FL (ref 8.9–12.7)
POTASSIUM SERPL-SCNC: 3.3 MMOL/L (ref 3.5–5.3)
PROT SERPL-MCNC: 6.3 G/DL (ref 6.4–8.4)
PROTHROMBIN TIME: 13.5 SECONDS (ref 12.3–15)
RBC # BLD AUTO: 3.94 MILLION/UL (ref 3.81–5.12)
RBC MORPH BLD: NORMAL
SODIUM SERPL-SCNC: 140 MMOL/L (ref 135–147)
WBC # BLD AUTO: 5.42 THOUSAND/UL (ref 4.31–10.16)

## 2025-07-22 PROCEDURE — 99285 EMERGENCY DEPT VISIT HI MDM: CPT | Performed by: EMERGENCY MEDICINE

## 2025-07-22 PROCEDURE — 85610 PROTHROMBIN TIME: CPT

## 2025-07-22 PROCEDURE — 36415 COLL VENOUS BLD VENIPUNCTURE: CPT

## 2025-07-22 PROCEDURE — 72125 CT NECK SPINE W/O DYE: CPT

## 2025-07-22 PROCEDURE — 85007 BL SMEAR W/DIFF WBC COUNT: CPT

## 2025-07-22 PROCEDURE — 85027 COMPLETE CBC AUTOMATED: CPT

## 2025-07-22 PROCEDURE — 70486 CT MAXILLOFACIAL W/O DYE: CPT

## 2025-07-22 PROCEDURE — 85730 THROMBOPLASTIN TIME PARTIAL: CPT

## 2025-07-22 PROCEDURE — 70450 CT HEAD/BRAIN W/O DYE: CPT

## 2025-07-22 PROCEDURE — 80053 COMPREHEN METABOLIC PANEL: CPT

## 2025-07-22 RX ORDER — ACETAMINOPHEN 325 MG/1
975 TABLET ORAL ONCE
Status: COMPLETED | OUTPATIENT
Start: 2025-07-22 | End: 2025-07-22

## 2025-07-22 RX ORDER — POTASSIUM CHLORIDE 1500 MG/1
40 TABLET, EXTENDED RELEASE ORAL ONCE
Status: COMPLETED | OUTPATIENT
Start: 2025-07-22 | End: 2025-07-22

## 2025-07-22 RX ADMIN — ACETAMINOPHEN 975 MG: 325 TABLET ORAL at 01:39

## 2025-07-22 RX ADMIN — POTASSIUM CHLORIDE 40 MEQ: 1500 TABLET, EXTENDED RELEASE ORAL at 02:06

## 2025-07-22 NOTE — EMTALA/ACUTE CARE TRANSFER
[facility] EMERGENCY DEPARTMENT  [address]  [place] [address]  Dept: [phone]      EMTALA TRANSFER CONSENT    NAME Rocío BARAHONA 1995                              MRN [id number]    I have been informed of my rights regarding examination, treatment, and transfer   by Dr. Cleveland Soto MD    Benefits: Specialized equipment and/or services available at the receiving facility (Include comment)________________________    Risks: Potential for delay in receiving treatment, Potential deterioration of medical condition, Loss of IV, Increased discomfort during transfer      Consent for Transfer:  I acknowledge that my medical condition has been evaluated and explained to me by the emergency department physician or other qualified medical person and/or my attending physician, who has recommended that I be transferred to the service of  Accepting Physician: Dr. Bowman at Accepting Facility Name, City & State : [facility]. The above potential benefits of such transfer, the potential risks associated with such transfer, and the probable risks of not being transferred have been explained to me, and I fully understand them.  The doctor has explained that, in my case, the benefits of transfer outweigh the risks.  I agree to be transferred.    I authorize the performance of emergency medical procedures and treatments upon me in both transit and upon arrival at the receiving facility.  Additionally, I authorize the release of any and all medical records to the receiving facility and request they be transported with me, if possible.  I understand that the safest mode of transportation during a medical emergency is an ambulance and that the Hospital advocates the use of this mode of transport. Risks of traveling to the receiving facility by car, including absence of medical control, life sustaining equipment, such as oxygen, and medical  personnel has been explained to me and I fully understand them.    (MUNA CORRECT BOX BELOW)  [  ]  I consent to the stated transfer and to be transported by ambulance/helicopter.  [  ]  I consent to the stated transfer, but refuse transportation by ambulance and accept full responsibility for my transportation by car.  I understand the risks of non-ambulance transfers and I exonerate the Hospital and its staff from any deterioration in my condition that results from this refusal.    X___________________________________________    DATE  25  TIME________  Signature of patient or legally responsible individual signing on patient behalf           RELATIONSHIP TO PATIENT_________________________          Provider Certification    NAME Rocío Gray                                         1995                              MRN [id number]    A medical screening exam was performed on the above named patient.  Based on the examination:    Condition Necessitating Transfer The primary encounter diagnosis was Facial pain. Diagnoses of Nasal fracture and Brain contusion (HCC) were also pertinent to this visit.    Patient Condition: The patient has been stabilized such that within reasonable medical probability, no material deterioration of the patient condition or the condition of the unborn child(crow) is likely to result from the transfer    Reason for Transfer: Level of Care needed not available at this facility    Transfer Requirements: Facility [facility]   Space available and qualified personnel available for treatment as acknowledged by Sophie Herron  Agreed to accept transfer and to provide appropriate medical treatment as acknowledged by       Dr. Bowman  Appropriate medical records of the examination and treatment of the patient are provided at the time of transfer   STAFF INITIAL WHEN COMPLETED _______  Transfer will be performed by qualified personnel from    and appropriate transfer equipment as  required, including the use of necessary and appropriate life support measures.    Provider Certification: I have examined the patient and explained the following risks and benefits of being transferred/refusing transfer to the patient/family:  General risk, such as traffic hazards, adverse weather conditions, rough terrain or turbulence, possible failure of equipment (including vehicle or aircraft), or consequences of actions of persons outside the control of the transport personnel, Unanticipated needs of medical equipment and personnel during transport, Risk of worsening condition, The possibility of a transport vehicle being unavailable      Based on these reasonable risks and benefits to the patient and/or the unborn child(crow), and based upon the information available at the time of the patient’s examination, I certify that the medical benefits reasonably to be expected from the provision of appropriate medical treatments at another medical facility outweigh the increasing risks, if any, to the individual’s medical condition, and in the case of labor to the unborn child, from effecting the transfer.    X____________________________________________ DATE 07/22/25        TIME_______      ORIGINAL - SEND TO MEDICAL RECORDS   COPY - SEND WITH PATIENT DURING TRANSFER

## 2025-07-22 NOTE — DISCHARGE INSTRUCTIONS
If you change your mind or develop any blurred vision, double vision, numbness, weakness, syncope, other symptoms as discussed return to the ER immediately for continued care and treatment.    CT head without contrast: Acute appearing comminuted nasal bone fracture. Mild soft tissue swelling of the nose. Small right frontal scalp hematoma. No calvarial fracture is seen., Focal areas of hyperdensity within the bilateral anteromedial and inferior frontal lobes, most conspicuous focus within the right anteromedial frontal lobe measuring approximately 0.7 x 1.2 x 0.8 cm (axial image 18, series 2 and sagittal image 42, series, 401); suspicious for parenchymal contusions in the setting of trauma.,
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
